# Patient Record
Sex: MALE | Race: WHITE
[De-identification: names, ages, dates, MRNs, and addresses within clinical notes are randomized per-mention and may not be internally consistent; named-entity substitution may affect disease eponyms.]

---

## 2021-12-19 ENCOUNTER — HOSPITAL ENCOUNTER (EMERGENCY)
Dept: HOSPITAL 11 - JP.ED | Age: 26
Discharge: HOME | End: 2021-12-19
Payer: COMMERCIAL

## 2021-12-19 DIAGNOSIS — M25.561: Primary | ICD-10-CM

## 2021-12-19 PROCEDURE — 96372 THER/PROPH/DIAG INJ SC/IM: CPT

## 2021-12-19 PROCEDURE — 99283 EMERGENCY DEPT VISIT LOW MDM: CPT
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
## 2022-03-05 ENCOUNTER — HOSPITAL ENCOUNTER (EMERGENCY)
Dept: HOSPITAL 11 - JP.ED | Age: 27
Discharge: HOME | End: 2022-03-05
Payer: COMMERCIAL

## 2022-03-05 DIAGNOSIS — Z20.822: ICD-10-CM

## 2022-03-05 DIAGNOSIS — R07.89: Primary | ICD-10-CM

## 2022-03-05 LAB — SARS-COV-2 RNA RESP QL NAA+PROBE: NEGATIVE

## 2022-12-15 ENCOUNTER — HOSPITAL ENCOUNTER (EMERGENCY)
Dept: HOSPITAL 11 - JP.ED | Age: 27
Discharge: HOME | End: 2022-12-15
Payer: MEDICAID
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
## 2022-12-15 DIAGNOSIS — S60.222A: ICD-10-CM

## 2022-12-15 DIAGNOSIS — S63.502A: Primary | ICD-10-CM

## 2022-12-15 PROCEDURE — 73130 X-RAY EXAM OF HAND: CPT

## 2022-12-15 PROCEDURE — 73110 X-RAY EXAM OF WRIST: CPT

## 2022-12-15 PROCEDURE — 99283 EMERGENCY DEPT VISIT LOW MDM: CPT

## 2023-09-24 ENCOUNTER — HOSPITAL ENCOUNTER (EMERGENCY)
Dept: HOSPITAL 11 - JP.ED | Age: 28
Discharge: HOME | End: 2023-09-24
Payer: MEDICAID

## 2023-09-24 DIAGNOSIS — R06.2: Primary | ICD-10-CM

## 2023-09-24 DIAGNOSIS — Z87.891: ICD-10-CM

## 2023-09-24 PROCEDURE — 96372 THER/PROPH/DIAG INJ SC/IM: CPT

## 2023-09-24 PROCEDURE — 71046 X-RAY EXAM CHEST 2 VIEWS: CPT

## 2023-09-24 PROCEDURE — 94640 AIRWAY INHALATION TREATMENT: CPT

## 2023-09-24 PROCEDURE — 99283 EMERGENCY DEPT VISIT LOW MDM: CPT

## 2023-10-01 ENCOUNTER — HOSPITAL ENCOUNTER (EMERGENCY)
Dept: HOSPITAL 11 - JP.ED | Age: 28
Discharge: HOME | End: 2023-10-01
Payer: COMMERCIAL

## 2023-10-01 DIAGNOSIS — J67.9: Primary | ICD-10-CM
